# Patient Record
Sex: FEMALE | Race: WHITE | NOT HISPANIC OR LATINO
[De-identification: names, ages, dates, MRNs, and addresses within clinical notes are randomized per-mention and may not be internally consistent; named-entity substitution may affect disease eponyms.]

---

## 2023-08-04 ENCOUNTER — APPOINTMENT (OUTPATIENT)
Dept: ORTHOPEDIC SURGERY | Facility: CLINIC | Age: 63
End: 2023-08-04
Payer: COMMERCIAL

## 2023-08-04 PROBLEM — Z00.00 ENCOUNTER FOR PREVENTIVE HEALTH EXAMINATION: Status: ACTIVE | Noted: 2023-08-04

## 2023-08-04 PROCEDURE — 99204 OFFICE O/P NEW MOD 45 MIN: CPT

## 2023-08-04 PROCEDURE — 73130 X-RAY EXAM OF HAND: CPT | Mod: LT

## 2023-08-04 NOTE — IMAGING
[de-identified] : Left middle and ring finger with swelling, skin intact. DIP extension splints. Sensation intact at radial and ulnar aspects of pulp. <2sec cap refill.  Left hand radiographs with middle ad ring finger avulsion fracture at base of distal phalanx dorsally.

## 2023-08-04 NOTE — ASSESSMENT
[FreeTextEntry1] : Left middle and ring finger mallet - will manage with closed management  Reviewed radiographs and pathonatomy with patient. Script for DIP extension splint provided. Explained we will manage with 24/7 DIP extension splint for 6 weeks and night time splinting for 2-3 weeks thereafter. Discussed that any flexion at DIP during the 6 weeks runs risk of breaking up scar tissue. Discussed expectation of 5-10 degree extension lag and stiffness.  F/u 6 week

## 2023-08-04 NOTE — HISTORY OF PRESENT ILLNESS
[de-identified] : 54 Y/O F, RHD, PMHx of CTS is present today for evaluation of LT middle finger pain. States she fell while walking the dog DOI 08/02/23, admits to going to [facility] same day of injury, xrays obtained, showed fracture. Patient is in a finger splint. Reports having more pain in ring finger. Taking ibuprofen prn.

## 2023-09-08 ENCOUNTER — APPOINTMENT (OUTPATIENT)
Dept: ORTHOPEDIC SURGERY | Facility: CLINIC | Age: 63
End: 2023-09-08
Payer: COMMERCIAL

## 2023-09-08 VITALS — HEIGHT: 66 IN | BODY MASS INDEX: 24.91 KG/M2 | WEIGHT: 155 LBS

## 2023-09-08 DIAGNOSIS — M79.646 PAIN IN UNSPECIFIED FINGER(S): ICD-10-CM

## 2023-09-08 DIAGNOSIS — Z87.891 PERSONAL HISTORY OF NICOTINE DEPENDENCE: ICD-10-CM

## 2023-09-08 DIAGNOSIS — Z78.9 OTHER SPECIFIED HEALTH STATUS: ICD-10-CM

## 2023-09-08 PROCEDURE — 99213 OFFICE O/P EST LOW 20 MIN: CPT

## 2023-09-08 NOTE — IMAGING
[de-identified] : Left middle and ring finger with mild swelling, skin intact 5-10 degree extension lags. Sensation intact at radial and ulnar aspects of pulp. <2sec cap refill.  Left hand radiographs with middle ad ring finger avulsion fracture at base of distal phalanx dorsally.

## 2023-09-08 NOTE — ASSESSMENT
[FreeTextEntry1] : Left middle and ring finger mallet - will continue to  manage with closed management  Reviewed radiographs and pathonatomy with patient. Will progress to night time splinting for 2-3 weeks. Discussed expectation of 5-10 degree extension lag and stiffness.  F/u prn

## 2023-10-04 ENCOUNTER — APPOINTMENT (OUTPATIENT)
Dept: ORTHOPEDIC SURGERY | Facility: CLINIC | Age: 63
End: 2023-10-04